# Patient Record
Sex: FEMALE | Race: WHITE | NOT HISPANIC OR LATINO | Employment: FULL TIME | URBAN - METROPOLITAN AREA
[De-identification: names, ages, dates, MRNs, and addresses within clinical notes are randomized per-mention and may not be internally consistent; named-entity substitution may affect disease eponyms.]

---

## 2020-07-02 ENCOUNTER — TRANSCRIBE ORDERS (OUTPATIENT)
Dept: ADMINISTRATIVE | Facility: HOSPITAL | Age: 40
End: 2020-07-02

## 2020-07-02 DIAGNOSIS — G56.00 CARPAL TUNNEL SYNDROME, UNSPECIFIED LATERALITY: Primary | ICD-10-CM

## 2020-09-16 ENCOUNTER — APPOINTMENT (OUTPATIENT)
Dept: INFUSION THERAPY | Facility: HOSPITAL | Age: 40
End: 2020-09-16

## 2020-09-19 ENCOUNTER — TELEMEDICINE (OUTPATIENT)
Dept: FAMILY MEDICINE CLINIC | Facility: TELEHEALTH | Age: 40
End: 2020-09-19

## 2020-09-19 DIAGNOSIS — H92.02 LEFT EAR PAIN: Primary | ICD-10-CM

## 2020-09-19 PROBLEM — G89.29 CHRONIC RLQ PAIN: Status: ACTIVE | Noted: 2018-03-27

## 2020-09-19 PROBLEM — R10.31 CHRONIC RLQ PAIN: Status: ACTIVE | Noted: 2018-03-27

## 2020-09-19 PROCEDURE — 99213 OFFICE O/P EST LOW 20 MIN: CPT | Performed by: NURSE PRACTITIONER

## 2020-09-19 RX ORDER — FAMOTIDINE 20 MG/1
TABLET, FILM COATED ORAL
COMMUNITY
Start: 2020-08-21

## 2020-09-19 RX ORDER — MONTELUKAST SODIUM 10 MG/1
TABLET ORAL
COMMUNITY
Start: 2020-08-20

## 2020-09-19 RX ORDER — FLUTICASONE PROPIONATE 50 MCG
2 SPRAY, SUSPENSION (ML) NASAL DAILY
Qty: 1 BOTTLE | Refills: 0 | Status: SHIPPED | OUTPATIENT
Start: 2020-09-19 | End: 2020-09-29

## 2020-09-19 RX ORDER — ALBUTEROL SULFATE 90 UG/1
AEROSOL, METERED RESPIRATORY (INHALATION)
COMMUNITY
Start: 2020-08-31

## 2020-09-19 RX ORDER — ERGOCALCIFEROL 1.25 MG/1
CAPSULE ORAL
COMMUNITY
Start: 2020-08-16

## 2020-09-19 RX ORDER — PROPRANOLOL HYDROCHLORIDE 10 MG/1
TABLET ORAL
COMMUNITY
Start: 2020-08-19

## 2020-09-19 RX ORDER — MELOXICAM 7.5 MG/1
TABLET ORAL
COMMUNITY
Start: 2020-09-01

## 2020-09-19 RX ORDER — HYDROXYZINE HYDROCHLORIDE 25 MG/1
TABLET, FILM COATED ORAL
COMMUNITY
Start: 2020-09-02

## 2020-09-19 RX ORDER — BENAZEPRIL HYDROCHLORIDE AND HYDROCHLOROTHIAZIDE 20; 12.5 MG/1; MG/1
TABLET ORAL
COMMUNITY
Start: 2020-08-19

## 2020-09-19 RX ORDER — CETIRIZINE HYDROCHLORIDE 10 MG/1
TABLET ORAL
COMMUNITY
Start: 2020-08-31

## 2020-09-19 RX ORDER — NYSTATIN 100000 U/G
CREAM TOPICAL
COMMUNITY
Start: 2020-06-13

## 2020-09-19 RX ORDER — TIZANIDINE 4 MG/1
TABLET ORAL
COMMUNITY
Start: 2020-07-15

## 2020-09-19 RX ORDER — SIMVASTATIN 10 MG
TABLET ORAL
COMMUNITY
Start: 2020-08-19

## 2020-09-19 RX ORDER — LEVOTHYROXINE SODIUM 112 UG/1
TABLET ORAL
COMMUNITY
Start: 2020-08-21

## 2020-09-19 RX ORDER — ACETAMINOPHEN AND CODEINE PHOSPHATE 120; 12 MG/5ML; MG/5ML
SOLUTION ORAL DAILY
COMMUNITY
Start: 2020-07-20

## 2020-09-19 RX ORDER — AMITRIPTYLINE HYDROCHLORIDE 25 MG/1
TABLET, FILM COATED ORAL
COMMUNITY
Start: 2020-08-19

## 2020-09-19 NOTE — PROGRESS NOTES
"CHIEF COMPLAINT  Chief Complaint   Patient presents with   • Earache         HPI  Marisela Munguia is a 39 y.o. female  presents with complaint of left earache for 2-3 days. Patient states she \"usually gets an ear infection about 1-2 times a year\" and this is what her ear feels like now. Denies recent trauma or injury to the ear, denies drainage or swelling of the ear, denies recent swimming. Patient states she did have a fever of 101.2 at noon yesterday (Friday) but none since. She states she has taken ibuprofen for the pain which helps until the ibuprofen wears off. She states she has had some mild intermittent nasal congestion, suffers from seasonal allergies, and takes a daily antihistamine.  PMH positive for HTN, T2DM, thyroid disease, seasonal allergies, asthma, and anxiety.  She was recently diagnosed with UTI and took 7 day course (starting on 9/8/20) of Bactrim.    Review of Systems   Constitutional: Positive for fever. Negative for activity change, appetite change, chills and fatigue.   HENT: Positive for congestion and ear pain. Negative for ear discharge, facial swelling, hearing loss, postnasal drip, rhinorrhea, sinus pressure, sinus pain, sneezing, sore throat, tinnitus, trouble swallowing and voice change.    Respiratory: Negative for cough and shortness of breath.    Cardiovascular: Negative for chest pain.   Gastrointestinal: Negative for diarrhea, nausea and vomiting.   Musculoskeletal: Negative for myalgias.   Skin: Negative for rash.   Neurological: Negative for headaches.   All other systems reviewed and are negative.      Past Medical History:   Diagnosis Date   • Allergic    • Anxiety    • Asthma    • Diabetes mellitus (CMS/[facility])     type 2   • GERD (gastroesophageal reflux disease)    • Hyperlipidemia    • Hypertension    • Hypothyroidism    • Obesity    • Otitis media        History reviewed. No pertinent family history.    Social History     Socioeconomic History   • Marital status:      " Spouse name: Not on file   • Number of children: Not on file   • Years of education: Not on file   • Highest education level: Not on file   Tobacco Use   • Smoking status: Never Smoker   • Smokeless tobacco: Never Used   Substance and Sexual Activity   • Alcohol use: Yes     Comment: rarely   • Drug use: Never   • Sexual activity: Defer         Breastfeeding No     PHYSICAL EXAM  Physical Exam   Constitutional: She is oriented to person, place, and time. She appears well-developed and well-nourished. She appears obese.  HENT:   Head: Normocephalic and atraumatic.   Right Ear: Hearing and external ear normal.   Left Ear: External ear normal. No drainage or swelling.   Mouth/Throat: Mouth/Lips are normal.  Eyes: Conjunctivae and EOM are normal. Right eye exhibits no discharge. Left eye exhibits no discharge. No scleral icterus.   Pulmonary/Chest: Effort normal.  No respiratory distress.  Lymphadenopathy:        Head (left side): Posterior auricular adenopathy present. No submandibular and no preauricular adenopathy present.        Left cervical: No anterior cervical adenopathy present.   Patient palpated pre-and post-auricular nodes and verbalized mild tenderness with post-auricular palpation   Neurological: She is alert and oriented to person, place, and time.   Skin: Skin is dry.   Psychiatric: She has a normal mood and affect.       No results found for this or any previous visit.    Marisela was seen today for earache.    Diagnoses and all orders for this visit:    Left ear pain  -     fluticasone (FLONASE) 50 MCG/ACT nasal spray; 2 sprays into the nostril(s) as directed by provider Daily for 10 days.    Plan of Care:  Discussed with patient my hesitance to prescribe an antibiotic without being able to visualize her eardrum, especially since she just completed a 7 day course of abx for a separate infection. Instructed to continue her daily antihistamine and will add the nasal steroid spray daily; alternate Tylenol  and ibuprofen for pain, use warm compress as needed. Instructed to follow up with PCP for an in-person visit for no improvement in 4-5 days or go to UC/ER for new or worsening symptoms (pain not relieved by Tylenol/Motrin, ear swelling or drainage, fever). Patient verbalized understanding and is agreeable to plan.    **if at any time experiences fever AND/OR cough AND/OR shortness of breath AND/OR loss of sense of taste or smell, has been advised to go to nearest urgent care or emergency department for evaluation AND/OR testing      FOLLOW-UP  As discussed during visit with PCP/[facility] if no improvement or Urgent Care/Emergency Department if worsening of symptoms    Patient verbalizes understanding of medication dosage, comfort measures, instructions for treatment and follow-up.    HAROON Epperson  09/19/2020  03:02 EDT    This visit was performed via Telehealth.  This patient has been instructed to follow-up with their primary care provider if their symptoms worsen or the treatment provided does not resolve their illness.    Video visit time spent on this patient aprox. 25 minutes

## 2020-09-19 NOTE — PATIENT INSTRUCTIONS
Earache, Adult  An earache, or ear pain, can be caused by many things, including:  · An infection.  · Ear wax buildup.  · Ear pressure.  · Something in the ear that should not be there (foreign body).  · A sore throat.  · Tooth problems.  · Jaw problems.  Treatment of the earache will depend on the cause. If the cause is not clear or cannot be determined, you may need to watch your symptoms until your earache goes away or until a cause is found.  Follow these instructions at home:  Pay attention to any changes in your symptoms. Take these actions to help with your pain:  · Take or apply over-the-counter and prescription medicines only as told by your health care provider.  · If you were prescribed an antibiotic medicine, use it as told by your health care provider. Do not stop using the antibiotic even if you start to feel better.  · Do not put anything in your ear other than medicine that is prescribed by your health care provider.  · If directed, apply heat to the affected area as often as told by your health care provider. Use the heat source that your health care provider recommends, such as a moist heat pack or a heating pad.  ? Place a towel between your skin and the heat source.  ? Leave the heat on for 20-30 minutes.  ? Remove the heat if your skin turns bright red. This is especially important if you are unable to feel pain, heat, or cold. You may have a greater risk of getting burned.  · If directed, put ice on the ear:  ? Put ice in a plastic bag.  ? Place a towel between your skin and the bag.  ? Leave the ice on for 20 minutes, 2-3 times a day.  · Try resting in an upright position instead of lying down. This may help to reduce pressure in your ear and relieve pain.  · Chew gum if it helps to relieve your ear pain.  · Treat any allergies as told by your health care provider.  · Keep all follow-up visits as told by your health care provider. This is important.  Contact a health care provider if:  · Your  pain does not improve within 2 days.  · Your earache gets worse.  · You have new symptoms.  · You have a fever.  Get help right away if:  · You have a severe headache.  · You have a stiff neck.  · You have trouble swallowing.  · You have redness or swelling behind your ear.  · You have fluid or blood coming from your ear.  · You have hearing loss.  · You feel dizzy.  This information is not intended to replace advice given to you by your health care provider. Make sure you discuss any questions you have with your health care provider.  Document Released: 08/04/2005 Document Revised: 11/30/2018 Document Reviewed: 06/12/2017  Elsevier Patient Education © 2020 [employer]

## 2020-12-14 ENCOUNTER — APPOINTMENT (OUTPATIENT)
Dept: INFUSION THERAPY | Facility: HOSPITAL | Age: 40
End: 2020-12-14

## 2021-02-22 ENCOUNTER — APPOINTMENT (OUTPATIENT)
Dept: INFUSION THERAPY | Facility: HOSPITAL | Age: 41
End: 2021-02-22

## 2021-03-31 ENCOUNTER — BULK ORDERING (OUTPATIENT)
Dept: CASE MANAGEMENT | Facility: OTHER | Age: 41
End: 2021-03-31

## 2021-03-31 DIAGNOSIS — Z23 IMMUNIZATION DUE: ICD-10-CM

## 2021-05-07 ENCOUNTER — APPOINTMENT (OUTPATIENT)
Dept: INFUSION THERAPY | Facility: HOSPITAL | Age: 41
End: 2021-05-07

## 2021-05-07 ENCOUNTER — TRANSCRIBE ORDERS (OUTPATIENT)
Dept: ADMINISTRATIVE | Facility: HOSPITAL | Age: 41
End: 2021-05-07

## 2021-05-07 DIAGNOSIS — G56.03 BILATERAL CARPAL TUNNEL SYNDROME: Primary | ICD-10-CM

## 2021-07-09 ENCOUNTER — HOSPITAL ENCOUNTER (OUTPATIENT)
Dept: INFUSION THERAPY | Facility: HOSPITAL | Age: 41
Discharge: HOME | End: 2021-07-09
Admitting: PSYCHIATRY & NEUROLOGY

## 2021-07-09 DIAGNOSIS — G56.03 BILATERAL CARPAL TUNNEL SYNDROME: ICD-10-CM

## 2021-07-09 PROCEDURE — 95911 NRV CNDJ TEST 9-10 STUDIES: CPT | Performed by: PSYCHIATRY & NEUROLOGY

## 2021-07-09 PROCEDURE — 95886 MUSC TEST DONE W/N TEST COMP: CPT

## 2021-07-09 PROCEDURE — 95911 NRV CNDJ TEST 9-10 STUDIES: CPT

## 2021-07-09 PROCEDURE — 95886 MUSC TEST DONE W/N TEST COMP: CPT | Performed by: PSYCHIATRY & NEUROLOGY

## 2021-07-09 NOTE — PROCEDURES
EMG and Nerve Conduction Studies    I.      Instrument used: Neuromax 1002  II.     Please see data sheets for tabular summary of NCS and details on methods, temperatures and lab standards.   III.    EMG muscles tested for upper extremity studies include the deltoid, biceps, triceps, pronator teres, extensor digitorum communis, first dorsal interosseous and abductor pollicis brevis.    IV.   EMG muscles tested for lower extremity studies include the vastus lateralis, tibialis anterior, peroneus longus, medial gastrocnemius and extensor digitorum brevis.    V.    Additional muscles tested as needed.  Paraspinal muscles tested as needed.   VI.   Please see data sheets for tabular summary of EMG findings.   VII. The complete report includes the data sheets.      Indication: Bilateral carpal tunnel syndrome  History: 40-year-old white female with history of diabetes now with normal blood sugars status post gastric bypass who has numbness tingling in both hands right worse than left she has had cortisone shots which have helped.  She does have some neck pain which does not radiate      Ht: Not obtained  Wt: Not obtained  HbA1C: No results found for: HGBA1C  TSH: No results found for: TSH    Technical summary:  Nerve conduction studies were obtained in both arms.  Skin temperatures were at least 32 °C measured on the palms.  Needle examination was obtained on selected muscles in both arms.    Results:  1.  Severely prolonged right median sensory latency at 6.0 ms with low amplitude of 11.9 µV.  Prolonged left median sensory latency at 4.4 ms with normal amplitude.  2.  Normal right ulnar sensory latency with low amplitude of 11.3 µV.  Normal left ulnar sensory latency and amplitude.  3.  Normal right radial sensory study.  4.  Severely prolonged right median motor latency at 6.5 ms with slow conduction velocity of 46.1 m/s.  Normal amplitudes.  Prolonged left median motor latency at 5.1 ms with normal conduction velocity.   The amplitude was low at 3.5 millivolts from wrist stimulation.  5.  Slow right ulnar motor conduction velocity in the short segment across the elbow at 42.9 m/s with normal velocity below the elbow.  Normal distal latency and amplitudes.  Normal left ulnar motor conduction velocities, distal latency and amplitudes.  6.  Needle examination of selected muscles in both arm showed normal insertional activities throughout.  There were normal motor units and recruitment patterns throughout cervical paraspinals at C7 showed no abnormality on either side.    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  There is some slowing of the right median motor conduction velocity in the forearm but no needle exam changes in the flexor pollicis longus or pronator teres to suggest a proximal median neuropathy.  In addition there is a moderate right ulnar neuropathy at the elbow.  There were no other features of a cervical radiculopathy on either side by the study.  Study results were discussed with the patient.    Fei Barrow M.D.              Dictated utilizing Dragon dictation.

## 2025-03-21 ENCOUNTER — E-VISIT (OUTPATIENT)
Dept: ADMINISTRATIVE | Facility: OTHER | Age: 45
End: 2025-03-21
Payer: COMMERCIAL

## 2025-03-21 NOTE — E-VISIT ESCALATED
Status: Referred Out  Date: 2025 16:02:03  Acuity Level: Within 24 hours  Referral message:  We're sorry you are not feeling well. Your safety is important to us. Foamy discharge is not common with bladder infections and could be a sign of some other condition. We would like for you to be seen in person to determine the cause   of your symptoms and the most effective treatment for you.  For the most appropriate care, please be seen:   At a clinic or urgent care  Within 24 hours   You won't be charged for this visit. We hope you feel better soon!   Patient: Marisela Munguia  Patient : 1980  Patient Address: [address] [place] [address]  Patient Phone: [phone]  Clinician Response: Unavailable  Diagnosis: Unavailable  Diagnosis ICD: Unavailable     Patient Interview Questions and Responses:  Clinical Protocol: UTI  Please select the reason for your visit today.: Urinary tract infection (UTI)  When did your symptoms start?: 1-3 days ago  Do you have any of the following symptoms? Pain or burning when urinating: Yes  Do you have any of the following symptoms? Urinating more often than usual: Yes  Do you have any of the following symptoms? A sudden urge to urinate: Yes  Do you have any of the following symptoms? Unable to hold urine: Yes  Do you have any of the following symptoms? Feeling like the bladder is never empty: Yes  Do you have any of the following symptoms? Foul-smelling urine: Yes  What color is your urine?: Yellow  Do you have any of the following vaginal symptoms? Discharge: Yes  Do you have any of the following vaginal symptoms? Itching: Yes  How would you describe the vaginal discharge? Select all that apply. Foamy: Yes  How would you describe the vaginal discharge? Select all that apply. Johnston: No  How would you describe the vaginal discharge? Select all that apply. Smooth: No  How would you describe the vaginal discharge? Select all that apply. Watery: No